# Patient Record
Sex: FEMALE | Race: WHITE
[De-identification: names, ages, dates, MRNs, and addresses within clinical notes are randomized per-mention and may not be internally consistent; named-entity substitution may affect disease eponyms.]

---

## 2017-03-24 ENCOUNTER — HOSPITAL ENCOUNTER (OUTPATIENT)
Dept: HOSPITAL 53 - M SDC | Age: 37
Discharge: HOME | End: 2017-03-24
Attending: SURGERY
Payer: COMMERCIAL

## 2017-03-24 VITALS — BODY MASS INDEX: 36.8 KG/M2 | WEIGHT: 200 LBS | HEIGHT: 62 IN

## 2017-03-24 VITALS — SYSTOLIC BLOOD PRESSURE: 136 MMHG | DIASTOLIC BLOOD PRESSURE: 86 MMHG

## 2017-03-24 DIAGNOSIS — Z98.51: ICD-10-CM

## 2017-03-24 DIAGNOSIS — K42.9: Primary | ICD-10-CM

## 2017-03-24 DIAGNOSIS — Z87.891: ICD-10-CM

## 2017-03-24 PROCEDURE — 49652: CPT

## 2017-03-24 PROCEDURE — 88302 TISSUE EXAM BY PATHOLOGIST: CPT

## 2017-03-24 NOTE — RO
DATE OF PROCEDURE:  03/24/2017

 

PREOPERATIVE DIAGNOSIS:  Incisional/umbilical hernia.

 

POSTOPERATIVE DIAGNOSIS:  Incisional/umbilical hernia.

 

PROCEDURE:  Umbilical/incisional hernia repair with mesh.

 

SURGEON:  Leo Gosselin, Jr., MD

 

ASSISTANT:

 

ANESTHESIA:  General endotracheal anesthesia.

 

ESTIMATED BLOOD LOSS:  Minimal.

 

FLUIDS:  Crystalloid.

 

BRIEF PROCEDURE SUMMARY:  The patient was brought to the operating room, was

prepped and draped in the usual sterile fashion.  The patient had a previous

tubal ligation with an incision at the umbilicus and at this site there was a

hernia present right at the umbilicus.  Thus, a curvilinear incision was made

superior to the umbilicus after the patient was prepped and draped in the usual

sterile fashion.  A combination of blunt and sharp dissection was dissected down

to the base of the hernia sac down to the fascia.  The fascia was grasped with

Kocher clamps, elevated and the base of the hernia sac was transected using

electrocautery.  The omentum, which was incarcerated within this hernia sac, was

then replaced into the abdominal cavity after taking down adhesions to the hernia

sac with electrocautery.  This was returned to the peritoneal cavity and then a

ventral patch, 6.4 cm, was placed intraperitoneally taking care to make sure that

this was abutting the peritoneum circumferentially.  The tails of the mesh were

sutured both superiorly and anteriorly and the fascial defect was closed

transversely using three figure-of-0  #0 Ethibond sutures.  #3-0 Vicryl was used

to tack the umbilicus to the fascia and #3-0 Vicryl was used to approximate the

dermis, #4-0 Vicryl was used to approximate the skin.  Steri-Strips and dry

sterile dressing was applied.  The patient was awakened, extubated, and brought

to recovery room awake, alert and hemodynamically stable.  Sponge and needle

counts were correct times two.

## 2018-12-05 ENCOUNTER — HOSPITAL ENCOUNTER (OUTPATIENT)
Dept: HOSPITAL 53 - M LAB REF | Age: 38
End: 2018-12-05
Attending: NURSE PRACTITIONER
Payer: COMMERCIAL

## 2018-12-05 DIAGNOSIS — E07.9: Primary | ICD-10-CM

## 2018-12-05 PROCEDURE — 86800 THYROGLOBULIN ANTIBODY: CPT

## 2018-12-06 LAB — FREE T3: 3 PG/ML (ref 2.2–4)

## 2018-12-07 LAB
THYROGLOBULIN ANTIBODY: 401.1 U/ML (ref ?–60)
THYROPEROXIDASE AB SERPL IA-ACNC: > 1300 U/ML (ref ?–60)

## 2019-01-11 ENCOUNTER — HOSPITAL ENCOUNTER (OUTPATIENT)
Dept: HOSPITAL 53 - M LAB REF | Age: 39
End: 2019-01-11
Attending: NURSE PRACTITIONER
Payer: COMMERCIAL

## 2019-01-11 DIAGNOSIS — E07.9: Primary | ICD-10-CM

## 2019-02-06 ENCOUNTER — HOSPITAL ENCOUNTER (OUTPATIENT)
Dept: HOSPITAL 53 - M LAB REF | Age: 39
End: 2019-02-06
Attending: NURSE PRACTITIONER
Payer: COMMERCIAL

## 2019-02-06 DIAGNOSIS — E01.0: Primary | ICD-10-CM

## 2020-02-27 ENCOUNTER — HOSPITAL ENCOUNTER (OUTPATIENT)
Dept: HOSPITAL 53 - M LAB REF | Age: 40
End: 2020-02-27
Attending: NURSE PRACTITIONER
Payer: COMMERCIAL

## 2020-02-27 DIAGNOSIS — D64.9: Primary | ICD-10-CM

## 2020-02-27 DIAGNOSIS — E03.9: ICD-10-CM

## 2021-03-01 ENCOUNTER — HOSPITAL ENCOUNTER (OUTPATIENT)
Dept: HOSPITAL 53 - M LAB REF | Age: 41
End: 2021-03-01
Attending: NURSE PRACTITIONER
Payer: COMMERCIAL

## 2021-03-01 DIAGNOSIS — D64.9: Primary | ICD-10-CM

## 2021-04-21 ENCOUNTER — HOSPITAL ENCOUNTER (OUTPATIENT)
Dept: HOSPITAL 53 - M RAD | Age: 41
End: 2021-04-21
Attending: SPECIALIST
Payer: COMMERCIAL

## 2021-04-21 DIAGNOSIS — K21.9: Primary | ICD-10-CM

## 2021-04-21 DIAGNOSIS — J38.7: ICD-10-CM

## 2021-04-21 NOTE — REP
INDICATION:

DYSPHAGIA.



COMPARISON:



None



TECHNIQUE:

This procedure was performed by Darlene Sinclair, [facility], under the direct

supervision of Dr. Cunningham.  Images were reviewed with Dr. Cunningham prior to dictation.



Liquid barium and gas producing crystals were given in the erect position, as well as

liquid barium in the prone oblique position in order to perform a double contrast

esophagram examination.



FINDINGS:

A single view PA chest x-ray is submitted as a  film.  The superior mediastinal

structures are midline.  The heart size is within normal limits.  The lungs are clear.



The oral and pharyngeal stages of deglutition were unremarkable.  However laryngeal

penetration was visualized.  Esophageal transport is prompt and efficient and there is

no evidence of esophagitis, stricture, or mucosal ring.  There is no evidence of a

hiatal hernia.  Gastroesophageal reflux to the level of the thoracic inlet was

visualized.





IMPRESSION:

1.  Gastroesophageal reflux to the level of thoracic inlet.

2.  Laryngeal penetration.



0.2 minutes of fluoroscopy time was utilized for this procedure. Some fluoroscopic

images are performed with last image hold technology.  These images require no

additional radiation.



<Electronically signed by Darlene Sinclair > 04/21/21 [id number]

<Electronically signed by Joel Cunningham > 04/21/21 [id number]

## 2021-06-19 ENCOUNTER — HOSPITAL ENCOUNTER (OUTPATIENT)
Dept: HOSPITAL 53 - M LABSMTC | Age: 41
End: 2021-06-19
Attending: ANESTHESIOLOGY
Payer: COMMERCIAL

## 2021-06-19 DIAGNOSIS — Z01.812: Primary | ICD-10-CM

## 2021-06-19 DIAGNOSIS — Z20.822: ICD-10-CM

## 2021-06-24 ENCOUNTER — HOSPITAL ENCOUNTER (OUTPATIENT)
Dept: HOSPITAL 53 - M SDC | Age: 41
LOS: 1 days | Discharge: HOME | End: 2021-06-25
Attending: UROLOGY
Payer: COMMERCIAL

## 2021-06-24 VITALS — HEIGHT: 60 IN | WEIGHT: 225.8 LBS | BODY MASS INDEX: 44.33 KG/M2

## 2021-06-24 VITALS — DIASTOLIC BLOOD PRESSURE: 69 MMHG | SYSTOLIC BLOOD PRESSURE: 118 MMHG

## 2021-06-24 VITALS — DIASTOLIC BLOOD PRESSURE: 70 MMHG | SYSTOLIC BLOOD PRESSURE: 111 MMHG

## 2021-06-24 VITALS — SYSTOLIC BLOOD PRESSURE: 143 MMHG | DIASTOLIC BLOOD PRESSURE: 76 MMHG

## 2021-06-24 VITALS — DIASTOLIC BLOOD PRESSURE: 75 MMHG | SYSTOLIC BLOOD PRESSURE: 160 MMHG

## 2021-06-24 DIAGNOSIS — Z79.899: ICD-10-CM

## 2021-06-24 DIAGNOSIS — R12: ICD-10-CM

## 2021-06-24 DIAGNOSIS — K66.0: ICD-10-CM

## 2021-06-24 DIAGNOSIS — N85.8: Primary | ICD-10-CM

## 2021-06-24 DIAGNOSIS — D25.9: ICD-10-CM

## 2021-06-24 DIAGNOSIS — R06.83: ICD-10-CM

## 2021-06-24 DIAGNOSIS — E66.9: ICD-10-CM

## 2021-06-24 DIAGNOSIS — N72: ICD-10-CM

## 2021-06-24 LAB
HCT VFR BLD AUTO: 43.4 % (ref 36–47)
HGB BLD-MCNC: 13.8 G/DL (ref 12–15.5)
MCH RBC QN AUTO: 27 PG (ref 27–33)
MCHC RBC AUTO-ENTMCNC: 31.8 G/DL (ref 32–36.5)
MCV RBC AUTO: 84.8 FL (ref 80–96)
PLATELET # BLD AUTO: 294 10^3/UL (ref 150–450)
RBC # BLD AUTO: 5.12 10^6/UL (ref 4–5.4)
WBC # BLD AUTO: 7 10^3/UL (ref 4–10)

## 2021-06-24 PROCEDURE — 85027 COMPLETE CBC AUTOMATED: CPT

## 2021-06-24 PROCEDURE — 58559 HYSTEROSCOPY LYSIS: CPT

## 2021-06-24 PROCEDURE — 58552 LAPARO-VAG HYST INCL T/O: CPT

## 2021-06-24 PROCEDURE — 86900 BLOOD TYPING SEROLOGIC ABO: CPT

## 2021-06-24 PROCEDURE — 88307 TISSUE EXAM BY PATHOLOGIST: CPT

## 2021-06-24 PROCEDURE — 36415 COLL VENOUS BLD VENIPUNCTURE: CPT

## 2021-06-24 PROCEDURE — 86850 RBC ANTIBODY SCREEN: CPT

## 2021-06-24 PROCEDURE — 96361 HYDRATE IV INFUSION ADD-ON: CPT

## 2021-06-24 PROCEDURE — 96360 HYDRATION IV INFUSION INIT: CPT

## 2021-06-24 PROCEDURE — 86901 BLOOD TYPING SEROLOGIC RH(D): CPT

## 2021-06-24 RX ADMIN — FENTANYL CITRATE PRN MCG: 50 INJECTION, SOLUTION INTRAMUSCULAR; INTRAVENOUS at 17:04

## 2021-06-24 RX ADMIN — FENTANYL CITRATE PRN MCG: 50 INJECTION, SOLUTION INTRAMUSCULAR; INTRAVENOUS at 17:10

## 2021-06-24 RX ADMIN — FENTANYL CITRATE PRN MCG: 50 INJECTION, SOLUTION INTRAMUSCULAR; INTRAVENOUS at 16:54

## 2021-06-25 VITALS — SYSTOLIC BLOOD PRESSURE: 131 MMHG | DIASTOLIC BLOOD PRESSURE: 69 MMHG

## 2021-06-25 VITALS — DIASTOLIC BLOOD PRESSURE: 58 MMHG | SYSTOLIC BLOOD PRESSURE: 127 MMHG

## 2021-06-25 VITALS — DIASTOLIC BLOOD PRESSURE: 52 MMHG | SYSTOLIC BLOOD PRESSURE: 102 MMHG

## 2021-06-25 LAB
HCT VFR BLD AUTO: 36.7 % (ref 36–47)
HGB BLD-MCNC: 11.5 G/DL (ref 12–15.5)
MCH RBC QN AUTO: 26.7 PG (ref 27–33)
MCHC RBC AUTO-ENTMCNC: 31.3 G/DL (ref 32–36.5)
MCV RBC AUTO: 85.3 FL (ref 80–96)
PLATELET # BLD AUTO: 261 10^3/UL (ref 150–450)
RBC # BLD AUTO: 4.3 10^6/UL (ref 4–5.4)
WBC # BLD AUTO: 9.9 10^3/UL (ref 4–10)

## 2021-06-25 NOTE — RO
OPERATIVE NOTE



DATE OF OPERATION: 06/24/2021



PREOPERATIVE DIAGNOSIS/INDICATION FOR SURGERY: Pain and bleeding.



POSTOPERATIVE DIAGNOSIS: Pain, bleeding, fibroids but also extensive unusual

atypical adhesions very likely postsurgical. This patient did have a previous

umbilical herniorrhaphy with mesh which we were able to go somewhat inferior to

but there were extensive adhesions over the fibroid uterus of the bowel, not

just some filmy adhesions that move away but loops and loops of bowel densely

adherent to the fibroids. 



PROCEDURE: Laparoscopic assisted vaginal hysterectomy with bilateral

salpingo-oophorectomy and lysis of adhesions as noted. 



SURGEON: Le Ojeda MD



ASSISTANT: None.



ANESTHESIA: General endotracheal anesthesia.



BRIEF DESCRIPTION OF PROCEDURE AND FINDINGS: Arleth was brought to the

operating room where sufficient general endotracheal anesthesia was induced.

She was prepped, draped and positioned in the usual sterile fashion. The uterus

was sounded to 10 cm.  The Davidson with the ability to back fill was placed and

then attention turned to the abdomen. The patient had a semilunar

supraumbilical incision for her herniorrhaphy and as had been discussed with

her preop, we made an inferior to the umbilicus semilunar incision and then

bluntly and sharply dissected down to the rectus fascia which was elevated with

Kocher clamps and then transversely incised and we were able to dissected

inferiorly to the peritoneal tissues and then to inferior to the mass that she

had at the umbilicus. This also brought us inferior to the pieces of omentum

that were adherent at the umbilicus to this mesh repair so that is present but

we did not correct that. We worked around that but then in the pelvis, there

were extensive adhesions of the bowel over the left ovary and infundibulopelvic

ligament. There were adhesions of the bowel onto the uterus and the fibroid.

Some of these were pictured, not just the epiploicae and not just some filmy

adhesions but loops of bowel flatly adherent to the fibroid were present.



So we put in two other 5 mm trocars so that we could just gently elevate these

and then used cold scissors through the most appropriate port whether it be the

operative port at the umbilicus or the two left and right 5 mm ports and

carefully slowly dissected away the bowel from the uterus. Fortunately using

just cold scissors so that we did not run a risk of cautery injury, we were

able to peel the bowel free. There were also some adhesions of omentum to the

anterior abdominal wall and of omentum to the uterus and we were able to back

fill the bladder and adhesion of the fibroid to the anterior abdominal wall. We

were able to confirm that these adhesions did not involve the bladder so we

were to use the Enseal to carefully dissect, cauterize and transect these

adhesions but of course not those involving the bowel and then having freed

that fibroid which was as large as the uterus and then isolated the right tube

and ovary. We went back to work on the left tube and ovary and very carefully

did some cold dissection there to isolate the infundibulopelvic ligament. With

traction in two other directions to free this from the bowel, we were able to

get a window so that we could cauterize and transect this with the Enseal just

working very slowly and purposefully with this dissection so that we could

avoid injury to the bowel or ureters. Once we had freed the left tube and

ovary, we went ahead and dissected the left round ligament and this did

improves some of our uterine mobility as well because the fibroid had extended

out from the fundus rather than lifting up the round so it had limited some of

our mobility. We were able to elevate the uterus just a little bit better after

that dissection and this helped in freeing up the adhesions around the right

ovary where we were able to isolate that infundibulopelvic ligament, carefully

cauterize and transect it again using the Enseal and then work our way through

the mesentery to the round ligament which also was cauterized and transected.

When we had both tubes and ovaries free and had dissected down to this level,

we went ahead and turned our attention to the vaginal portion of the case.

Instruments were removed from the abdomen. This course of plan to look again to

confirm especially on that left side that our dissection was good. So we went

ahead and turned our attention to the vaginal portion of the case.



Working vaginally, the uterine manipulator was removed. Single tooth tenacula

were placed on the anterior and posterior aspects of the cervix and a

circumferential incision was made around the base of the cervix. The cardinal

ligaments were then isolated, then clamped with the Cevallos clamps, transected

and ligated using 0 Vicryl and then we continued that dissection to the

uterosacrals which were clamped, transected and ligated, entering the

peritoneum posteriorly and then continued the dissection anteriorly to dissect

the bladder flap away from the field of dissection. With the bladder free from

the uterus and peritoneum entered anteriorly and posteriorly, we were able to

carefully work our way on the lateral aspects of the uterus to clamp, transect

and ligate the uterine vasculature. Again, 0 Vicryl was used throughout. After

we had reached the level of our previous dissection, we were able to deliver

the uterus but because it was enlarged, we did cut the ovaries off so that we

could deliver the uterus and then see those pedicles and make sure that we were

not placing them under undue traction so with the uterus out, we were able to

carefully bring the ovaries and tubes down, clamp the remaining pedicles,

carefully ligate them and deliver both ovaries and tubes as well. There was one

Filshie clip that came. I did not see the other one so I do assume that it is

in the abdomen because there was scarring consistent with previous tubal on

both tubes but only the one delivered with the tissues. We then carefully

evaluated those pedicles, used a sponge stick and some long Allises to hold the

cuff and carefully evaluate them with good hemostasis confirmed. We went ahead,

put in angled stitches of 0 Vicryl, also incorporating the uterosacrals and

then closed the cuff with a running locked stitch of 0 Vicryl with good

approximation and hemostasis achieved. Given the difficulty we had had above,

we went ahead and refilled with the CO2 and reevaluated the pelvis and

confirmed again good hemostasis, lack of evidence of injury to other tissues

and let that pressure down enough to make sure that it was not pressure

stopping the bleeding as well and when we had confirmed again good hemostasis,

we allowed to let the CO2 out of the abdomen, closed the fascial wound with 0

Vicryl retention sutures and then the skin on all three wounds we closed with

subcuticular stitches of 3-0 Vicryl with good approximation and hemostasis

achieved on each and then dry sterile dressings were then applied. 



ESTIMATED BLOOD LOSS FOR THE PROCEDURE: About 75 mL. 



FLUID REPLACEMENT: Crystalloid.



COMPLICATIONS: We did not have a surgical complication but we did have an

unexpected change in the surgery with the extensive nature of the patient's

adhesions which took up a significant portion of the case in an atypical way so

just not incidental adhesions and given that about the half the case was

working on those adhesions although that is not a complication, certainly a

significant finding for this patient. Fortunately, her uterus, ovaries and

tubes are now out so she should not be troubled by that. 



SPECIMENS: Of course uterus, ovaries and tubes and of course the cervix with

that. 



DRAINS: None except the Davidson.



CONDITION AND DISPOSITION: Arleth tolerated the procedure well and was

recovering in the recovery room in good condition.

## 2021-11-24 ENCOUNTER — HOSPITAL ENCOUNTER (OUTPATIENT)
Dept: HOSPITAL 53 - M LABSMTC | Age: 41
End: 2021-11-24
Attending: PEDIATRICS
Payer: COMMERCIAL

## 2021-11-24 DIAGNOSIS — Z20.822: Primary | ICD-10-CM

## 2022-03-08 ENCOUNTER — HOSPITAL ENCOUNTER (OUTPATIENT)
Dept: HOSPITAL 53 - M LAB REF | Age: 42
End: 2022-03-08
Attending: NURSE PRACTITIONER
Payer: COMMERCIAL

## 2022-03-08 DIAGNOSIS — E03.9: ICD-10-CM

## 2022-03-08 DIAGNOSIS — D64.9: Primary | ICD-10-CM

## 2022-03-09 LAB
IRON SERPL-MCNC: 35 UG/DL (ref 50–170)
T3FREE SERPL-MCNC: 2.7 PG/ML (ref 2.2–4)